# Patient Record
Sex: FEMALE | Race: WHITE | NOT HISPANIC OR LATINO | Employment: FULL TIME | URBAN - METROPOLITAN AREA
[De-identification: names, ages, dates, MRNs, and addresses within clinical notes are randomized per-mention and may not be internally consistent; named-entity substitution may affect disease eponyms.]

---

## 2024-08-26 ENCOUNTER — APPOINTMENT (OUTPATIENT)
Dept: GENERAL RADIOLOGY | Facility: HOSPITAL | Age: 24
End: 2024-08-26
Payer: MEDICAID

## 2024-08-26 ENCOUNTER — HOSPITAL ENCOUNTER (OUTPATIENT)
Facility: HOSPITAL | Age: 24
Discharge: HOME | End: 2024-08-26
Attending: EMERGENCY MEDICINE | Admitting: EMERGENCY MEDICINE
Payer: MEDICAID

## 2024-08-26 VITALS
OXYGEN SATURATION: 96 % | HEART RATE: 98 BPM | WEIGHT: 114.6 LBS | DIASTOLIC BLOOD PRESSURE: 84 MMHG | HEIGHT: 61 IN | RESPIRATION RATE: 16 BRPM | TEMPERATURE: 98.4 F | SYSTOLIC BLOOD PRESSURE: 129 MMHG | BODY MASS INDEX: 21.64 KG/M2

## 2024-08-26 DIAGNOSIS — J18.9 PNEUMONIA OF LEFT UPPER LOBE DUE TO INFECTIOUS ORGANISM: Primary | ICD-10-CM

## 2024-08-26 LAB
FLUAV SUBTYP SPEC NAA+PROBE: NOT DETECTED
FLUBV RNA ISLT QL NAA+PROBE: NOT DETECTED
SARS-COV-2 RNA RESP QL NAA+PROBE: NOT DETECTED

## 2024-08-26 PROCEDURE — 99203 OFFICE O/P NEW LOW 30 MIN: CPT | Performed by: EMERGENCY MEDICINE

## 2024-08-26 PROCEDURE — 71046 X-RAY EXAM CHEST 2 VIEWS: CPT

## 2024-08-26 PROCEDURE — G0463 HOSPITAL OUTPT CLINIC VISIT: HCPCS | Performed by: EMERGENCY MEDICINE

## 2024-08-26 PROCEDURE — 87636 SARSCOV2 & INF A&B AMP PRB: CPT | Performed by: EMERGENCY MEDICINE

## 2024-08-26 RX ORDER — AZITHROMYCIN 250 MG/1
250 TABLET, FILM COATED ORAL DAILY
Qty: 4 TABLET | Refills: 0 | Status: SHIPPED | OUTPATIENT
Start: 2024-08-27 | End: 2024-08-31

## 2024-08-26 RX ORDER — AZITHROMYCIN 250 MG/1
500 TABLET, FILM COATED ORAL ONCE
Status: COMPLETED | OUTPATIENT
Start: 2024-08-26 | End: 2024-08-26

## 2024-08-26 RX ADMIN — AZITHROMYCIN DIHYDRATE 500 MG: 250 TABLET ORAL at 14:28

## 2024-08-26 NOTE — DISCHARGE INSTRUCTIONS
Take antibiotics as prescribed, complete the entire course.  Return to ER for any emergent concerns.  Follow-up with primary care physician for continued and repeat evaluation.  Over-the-counter Tylenol and/or ibuprofen as needed for pain.

## 2024-08-26 NOTE — [facility] PROVIDER NOTE
Subjective   History of Present Illness  Patient is a 24-year-old female presents emergency room with complaints of cough, congestion and back pain.  Patient states that her symptoms started a couple days ago.  Patient reports that it was initially cough and congestion.  She states that she thought she was coming down with something like COVID-19.  Patient states that her symptoms have progressed and now she has developed some shortness of breath at times and some back pain.  She is concerned for some sort of serious lung issue.  She is here for an evaluation.        Review of Systems   Constitutional: Negative.  Negative for chills, fatigue and fever.   Eyes: Negative.    Respiratory:  Positive for cough and shortness of breath. Negative for chest tightness.    Cardiovascular:  Negative for chest pain and palpitations.   Gastrointestinal:  Negative for abdominal pain, diarrhea, nausea and vomiting.   Genitourinary: Negative.    Musculoskeletal:  Positive for back pain.   Skin: Negative.  Negative for rash.   Neurological: Negative.  Negative for syncope, weakness, numbness and headaches.   Psychiatric/Behavioral: Negative.     All other systems reviewed and are negative.      No past medical history on file.    No Known Allergies    No past surgical history on file.    No family history on file.    Social History     Socioeconomic History    Marital status: Single           Objective   Physical Exam  Vitals and nursing note reviewed.   Constitutional:       General: She is not in acute distress.     Appearance: She is normal weight. She is not ill-appearing.      Comments: The following exam was performed from a distance of 6 feet.  The patient had presumed or suspected upper respiratory infection that may be COVID-19.  The patient was in a negative pressure room if possible.  The provider as well as the patient and/or family members were asked to wear a mask when possible.   HENT:      Head: Normocephalic and  atraumatic.      Right Ear: External ear normal.      Left Ear: External ear normal.      Nose: Nose normal.   Eyes:      Extraocular Movements: Extraocular movements intact.      Conjunctiva/sclera: Conjunctivae normal.      Pupils: Pupils are equal, round, and reactive to light.   Cardiovascular:      Rate and Rhythm: Normal rate and regular rhythm.      Comments: Per the bedside cardiac monitor  Pulmonary:      Effort: Pulmonary effort is normal. No respiratory distress.      Comments: Patient's oxygen saturation was greater than 90% per the bedside pulse oximetry.  There were NO audible abnormal breath sounds present.  Abdominal:      General: Abdomen is flat. There is no distension.   Musculoskeletal:         General: No swelling, deformity or signs of injury. Normal range of motion.      Cervical back: Normal range of motion and neck supple.   Skin:     General: Skin is warm.      Capillary Refill: Capillary refill takes less than 2 seconds.      Findings: No erythema.   Neurological:      General: No focal deficit present.      Mental Status: She is alert and oriented to person, place, and time. Mental status is at baseline.   Psychiatric:         Mood and Affect: Mood normal.         Procedures           ED Course  ED Course as of 08/26/24 1452   Mon Aug 26, 2024   1408 Chest x-ray shows a left upper lobe pneumonia. [KZ]   1420 COVID and flu are negative. [KZ]      ED Course User Index  [KZ] Zeke Hill MD                                           Medical Decision Making  The patient presents to the emergency room with symptoms concerning for upper respiratory infection of unknown etiology.  Given their presenting symptoms and physical exam, the differential diagnosis includes bacterial/viral pharyngitis, COVID-19, influenza a/B, upper respiratory infection of unspecified viral etiology, sinusitis, tonsillitis, bronchitis, and/or pneumonia.  Appropriate viral swabs, strep swabs, imaging ordered if  necessary.    Patient found to have a left upper lobe pneumonia.  She is given azithromycin here and a prescription of azithromycin to go home with.        Problems Addressed:  Pneumonia of left upper lobe due to infectious organism: complicated acute illness or injury    Amount and/or Complexity of Data Reviewed  Radiology: ordered. Decision-making details documented in ED Course.    Risk  Prescription drug management.        Final diagnoses:   Pneumonia of left upper lobe due to infectious organism       ED Disposition  ED Disposition       ED Disposition   Discharge    Condition   Stable    Comment   --               PATIENT CONNECTION - [place] [id number]  [phone]  Call today  To establish a Primary Care Provider         Medication List        New Prescriptions      azithromycin 250 MG tablet  Commonly known as: ZITHROMAX  Take 1 tablet by mouth Daily for 4 days.  Start taking on: August 27, 2024               Where to Get Your Medications        These medications were sent to [id number] - [place], [place] - [address] [place] [address] AT [address] [place] [address] - [phone]  - [phone] [address] [place] [address] [place] [address]      Phone: [phone]   azithromycin 250 MG tablet

## 2024-08-26 NOTE — Clinical Note
[facility] [place] [id number] [place] [address] [place] [address]  Phone: [phone]    Merlyn Dias was seen and treated in our emergency department on 8/26/2024.  She may return to work on 08/28/2024.         Thank you for choosing [facility].    Zeke Hill MD